# Patient Record
Sex: FEMALE | Employment: OTHER | URBAN - METROPOLITAN AREA
[De-identification: names, ages, dates, MRNs, and addresses within clinical notes are randomized per-mention and may not be internally consistent; named-entity substitution may affect disease eponyms.]

---

## 2017-10-21 ENCOUNTER — HOSPITAL ENCOUNTER (EMERGENCY)
Facility: CLINIC | Age: 32
Discharge: HOME | End: 2017-10-21
Attending: EMERGENCY MEDICINE | Admitting: EMERGENCY MEDICINE
Payer: COMMERCIAL

## 2017-10-21 VITALS
SYSTOLIC BLOOD PRESSURE: 124 MMHG | BODY MASS INDEX: 24.84 KG/M2 | RESPIRATION RATE: 18 BRPM | WEIGHT: 123 LBS | OXYGEN SATURATION: 100 % | DIASTOLIC BLOOD PRESSURE: 80 MMHG | TEMPERATURE: 98.9 F

## 2017-10-21 DIAGNOSIS — K08.89 PAIN, DENTAL: ICD-10-CM

## 2017-10-21 DIAGNOSIS — R07.0 THROAT PAIN: ICD-10-CM

## 2017-10-21 LAB
DEPRECATED S PYO AG THROAT QL EIA: NORMAL
SPECIMEN SOURCE: NORMAL

## 2017-10-21 PROCEDURE — 87880 STREP A ASSAY W/OPTIC: CPT | Performed by: EMERGENCY MEDICINE

## 2017-10-21 PROCEDURE — 99283 EMERGENCY DEPT VISIT LOW MDM: CPT

## 2017-10-21 PROCEDURE — 87081 CULTURE SCREEN ONLY: CPT | Performed by: EMERGENCY MEDICINE

## 2017-10-21 PROCEDURE — 25000132 ZZH RX MED GY IP 250 OP 250 PS 637: Performed by: EMERGENCY MEDICINE

## 2017-10-21 RX ORDER — IBUPROFEN 200 MG
600 TABLET ORAL EVERY 6 HOURS PRN
Qty: 60 TABLET | Refills: 0 | Status: SHIPPED | OUTPATIENT
Start: 2017-10-21

## 2017-10-21 RX ORDER — PENICILLIN V POTASSIUM 500 MG/1
500 TABLET, FILM COATED ORAL 4 TIMES DAILY
Qty: 40 TABLET | Refills: 0 | Status: SHIPPED | OUTPATIENT
Start: 2017-10-21 | End: 2017-10-31

## 2017-10-21 RX ORDER — IBUPROFEN 600 MG/1
600 TABLET, FILM COATED ORAL ONCE
Status: COMPLETED | OUTPATIENT
Start: 2017-10-21 | End: 2017-10-21

## 2017-10-21 RX ADMIN — IBUPROFEN 600 MG: 600 TABLET ORAL at 01:49

## 2017-10-21 ASSESSMENT — ENCOUNTER SYMPTOMS
VOMITING: 0
FEVER: 1
SHORTNESS OF BREATH: 0
VOICE CHANGE: 0
SORE THROAT: 1
NAUSEA: 0

## 2017-10-21 NOTE — ED AVS SNAPSHOT
[facility] Emergency Department    [address]    [facility] [address]    Phone:  [phone]    Fax:  [phone]                                       Penny Juan   MRN: [id number]    Department:  [facility] Emergency Department   Date of Visit:  10/21/2017           After Visit Summary Signature Page     I have received my discharge instructions, and my questions have been answered. I have discussed any challenges I see with this plan with the nurse or doctor.    ..........................................................................................................................................  Patient/Patient Representative Signature      ..........................................................................................................................................  Patient Representative Print Name and Relationship to Patient    ..................................................               ................................................  Date                                            Time    ..........................................................................................................................................  Reviewed by Signature/Title    ...................................................              ..............................................  Date                                                            Time

## 2017-10-21 NOTE — ED PROVIDER NOTES
History     Chief Complaint:  Fever and Pharyngitis      HPI   Penny Juan is a 32 year old female who presents to the emergency department today for evaluation of fever and pharyngitis. The patient reports having a sore throat for two days as well as a tooth ache for one week from a tooth that has been broken for months. She further complains of subjective fever. Therefore, she presents to the emergency department for evaluation. On presentation, she states others around her have had a sore throat.    Allergies:  No Known Drug Allergies    Medications:    Albuterol as needed  Docusate sodium 100 MG tablet  Multiple vitamin PO  Iron supplement PO    Past Medical History:    Asthma  Anemia    Past Surgical History:        Family History:    History reviewed. No pertinent family history.     Social History:  The patient was unaccompanied to the ED.  Smoking Status: Never  Smokeless Tobacco: Never  Alcohol Use: Yes  Marital Status:       Review of Systems   Constitutional: Positive for fever (subjective).   HENT: Positive for dental problem and sore throat. Negative for voice change.    Respiratory: Negative for shortness of breath.    Gastrointestinal: Negative for nausea and vomiting.   All other systems reviewed and are negative.    Physical Exam   First Vitals:  BP: 124/80  Heart Rate: 89  Temp: 100.9  F (38.3  C)  Resp: 18  Weight: 55.8 kg (123 lb)  SpO2: 100 %    Physical Exam  Constitutional: Patient appears well-developed and well-nourished. There is mild distress.   HENT:   Head: No external signs of trauma. No lesions noted.  Eyes: PEERL, EOMI B/L, no pain or limitation of superior gaze  Ears: Normal B/L TM and external canals  Nose: Noncongested, no exudates. No rhinorrhea. No FB noted  Mouth/Throat:                         Mucous membranes are moist and normal.                         No Oropharyngeal exudate.                         Mild oropharyngeal erythema noted.                         No tonsilar swelling noted.                         No uvular deviation noted.                        No swelling noted on the floor of the mouth             The 2nd molar from the back on the right upper side seems fractured, or perhaps a filling has fallen out. Mild gingival erythema without abscess appreciated.  Neck:  No posterior cervical LAD noted. FROM. Neck is supple  Cardiovascular: Normal rate, regular rhythm and normal heart sounds.  Exam reveals no gallop and no friction rub.  No murmur heard. Normal peripheral pulses.  Pulmonary/Chest: Effort normal and breath sounds normal. No respiratory distress. Patient has no wheezes. Patient has no rales.   Abdominal: Soft. There is no tenderness.   Extremities: No edema noted  Neurological: Patient is alert and oriented to person, place, and time.   Skin: Skin is warm and dry. There is no diaphoresis noted.     Emergency Department Course     Laboratory:  Laboratory findings were communicated with the patient who voiced understanding of the findings.  Rapid strep screen: negative     Strep culture: pending    Interventions:  0149 Advil/Motrin 600 mg PO    Emergency Department Course:  Nursing notes and vitals reviewed.  The patient's throat was swabbed and this sample was sent for rapid strep screen, findings above.   0140: I performed an exam of the patient as documented above.   0240: Patient rechecked and updated.   Findings and plan explained to the Patient. Patient discharged home with instructions regarding supportive care, medications, and reasons to return. The importance of close follow-up was reviewed. The patient was prescribed Advil/Motrin and Veetid.  I personally reviewed the laboratory results with the Patient and answered all related questions prior to discharge.    Impression & Plan      Medical Decision Making:  This patient presented with sore throat and clinical evidence of pharyngitis.  The rapid strep test is negative, and  formal culture has been set up in the lab. There is no clinical evidence of  peritonsillar abscess, retropharyngeal abscess, Lemierre's Syndrome, epiglottis, or Amador's angina. The etiology is most likely viral.  The patient's symptoms are consistent with viral pharyngitis.  I have recommended treatment with analgesics, and we will await formal culture results.  If the culture is positive, an ED physician will call the patient to initiate anti-microbial therapy. Return if increasing pain, change in voice, neck pain, vomiting, fever, or shortness of breath. Follow-up with primary physician if not improving in 3-5 days.    The patient presents with a toothache.  There is no abscess detected around the tooth amenable to incision and drainage.  The differential diagnosis includes: cracked tooth syndrome, pulpitis, sub-apical abscess, amongst others.  There is no evidence of  infection, significant facial swelling, or Amador's angina. There are no posterior pharyngeal space infections detected. Appropriate use of Ibuprofen discussed. Dental clinic list given.  Follow up with a dentist/endodontist in the coming days is indicated for further work up and treatment.    Impression:    ICD-10-CM    1. Throat pain R07.0    2. Pain, dental K08.89        Disposition:  discharged to home    Discharge Medications:  New Prescriptions    IBUPROFEN (ADVIL/MOTRIN) 200 MG TABLET    Take 3 tablets (600 mg) by mouth every 6 hours as needed for mild pain or fever    PENICILLIN V POTASSIUM (VEETID) 500 MG TABLET    Take 1 tablet (500 mg) by mouth 4 times daily for 10 days       Scribe Disclosure:  Eduarda LIU, am serving as a scribe at 2:38 AM on 10/21/2017 to document services personally performed by Michael Gongora DO based on my observations and the provider's statements to me.   10/21/2017   [facility] EMERGENCY DEPARTMENT       Michael Gongora DO  10/21/17 0606

## 2017-10-21 NOTE — DISCHARGE INSTRUCTIONS
Viral Pharyngitis (Sore Throat)    You (or your child, if your child is the patient) have pharyngitis (sore throat). This infection is caused by a virus. It can cause throat pain that is worse when swallowing, aching all over, headache, and fever. The infection may be spread by coughing, kissing, or touching others after touching your mouth or nose. Antibiotic medications do not work against viruses, so they are not used for treating this condition.  Home care    If your symptoms are severe, rest at home. Return to work or school when you feel well enough.     Drink plenty of fluids to avoid dehydration.    For children: Use acetaminophen for fever, fussiness or discomfort. In infants over six months of age, you may use ibuprofen instead of acetaminophen. (NOTE: If your child has chronic liver or kidney disease or ever had a stomach ulcer or GI bleeding, talk with your doctor before using these medicines.) (NOTE: Aspirin should never be used in anyone under 18 years of age who is ill with a fever. It may cause severe liver damage.)     For adults: You may use acetaminophen or ibuprofen to control pain or fever, unless another medicine was prescribed for this. (NOTE: If you have chronic liver or kidney disease or ever had a stomach ulcer or GI bleeding, talk with your doctor before using these medicines.)    Throat lozenges or numbing throat sprays can help reduce pain. Gargling with warm salt water will also help reduce throat pain. For this, dissolve 1/2 teaspoon of salt in 1 glass of warm water. To help soothe a sore throat, children can sip on juice or a popsicle. Children 5 years and older can also suck on a lollipop or hard candy.    Avoid salty or spicy foods, which can be irritating to the throat.  Follow-up care  Follow up with your healthcare provider or our staff if you are not improving over the next week.  When to seek medical advice  Call your healthcare provider right away if any of these  occur:    Fever as directed by your doctor.  For children, seek care if:    Your child is of any age and has repeated fevers above 104 F (40 C).    Your child is younger than 2 years of age and has a fever of 100.4 F (38 C) that continues for more than 1 day.    Your child is 2 years old or older and has a fever of 100.4 F (38 C) that continues for more than 3 days.    New or worsening ear pain, sinus pain, or headache    Painful lumps in the back of neck    Stiff neck    Lymph nodes are getting larger    Inability to swallow liquids, excessive drooling, or inability to open mouth wide due to throat pain    Signs of dehydration (very dark urine or no urine, sunken eyes, dizziness)    Trouble breathing or noisy breathing    Muffled voice    New rash    Child appears to be getting sicker  Date Last Reviewed: 4/13/2015 2000-2017 [employer]. [address] [place] [address], [place] [address]. All rights reserved. This information is not intended as a substitute for professional medical care. Always follow your healthcare professional's instructions.

## 2017-10-21 NOTE — ED AVS SNAPSHOT
[facility] Emergency Department    [address]    [facility] [address]    Phone:  [phone]    Fax:  [phone]                                       Penny Juan   MRN: [id number]    Department:  [facility] Emergency Department   Date of Visit:  10/21/2017           Patient Information     Date Of Birth          1985        Your diagnoses for this visit were:     Throat pain     Pain, dental        You were seen by Michael Gongora DO.      Follow-up Information     Follow up with Melissa, Obi Hennessy. Call in 2 days.    Why:  As needed    Contact information:    [address] [facility]  [place] [address]  [phone]          Follow up with [facility] Emergency Department.    Specialty:  EMERGENCY MEDICINE    Why:  If symptoms worsen    Contact information:    [address]  [facility] [address]  [phone]        Discharge Instructions         Viral Pharyngitis (Sore Throat)    You (or your child, if your child is the patient) have pharyngitis (sore throat). This infection is caused by a virus. It can cause throat pain that is worse when swallowing, aching all over, headache, and fever. The infection may be spread by coughing, kissing, or touching others after touching your mouth or nose. Antibiotic medications do not work against viruses, so they are not used for treating this condition.  Home care    If your symptoms are severe, rest at home. Return to work or school when you feel well enough.     Drink plenty of fluids to avoid dehydration.    For children: Use acetaminophen for fever, fussiness or discomfort. In infants over six months of age, you may use ibuprofen instead of acetaminophen. (NOTE: If your child has chronic liver or kidney disease or ever had a stomach ulcer or GI bleeding, talk with your doctor before using these medicines.) (NOTE: Aspirin should never be used in anyone under 18 years of age who  is ill with a fever. It may cause severe liver damage.)     For adults: You may use acetaminophen or ibuprofen to control pain or fever, unless another medicine was prescribed for this. (NOTE: If you have chronic liver or kidney disease or ever had a stomach ulcer or GI bleeding, talk with your doctor before using these medicines.)    Throat lozenges or numbing throat sprays can help reduce pain. Gargling with warm salt water will also help reduce throat pain. For this, dissolve 1/2 teaspoon of salt in 1 glass of warm water. To help soothe a sore throat, children can sip on juice or a popsicle. Children 5 years and older can also suck on a lollipop or hard candy.    Avoid salty or spicy foods, which can be irritating to the throat.  Follow-up care  Follow up with your healthcare provider or our staff if you are not improving over the next week.  When to seek medical advice  Call your healthcare provider right away if any of these occur:    Fever as directed by your doctor.  For children, seek care if:    Your child is of any age and has repeated fevers above 104 F (40 C).    Your child is younger than 2 years of age and has a fever of 100.4 F (38 C) that continues for more than 1 day.    Your child is 2 years old or older and has a fever of 100.4 F (38 C) that continues for more than 3 days.    New or worsening ear pain, sinus pain, or headache    Painful lumps in the back of neck    Stiff neck    Lymph nodes are getting larger    Inability to swallow liquids, excessive drooling, or inability to open mouth wide due to throat pain    Signs of dehydration (very dark urine or no urine, sunken eyes, dizziness)    Trouble breathing or noisy breathing    Muffled voice    New rash    Child appears to be getting sicker  Date Last Reviewed: 4/13/2015 2000-2017 [employer]. [address] [place] [address], [place] [address]. All rights reserved. This information is not intended as a substitute for professional medical  care. Always follow your healthcare professional's instructions.          24 Hour Appointment Hotline       To make an appointment at any [facility], call [phone] ([phone]). If you don't have a family doctor or clinic, we will help you find one. [facility] are conveniently located to serve the needs of you and your family.             Review of your medicines      START taking        Dose / Directions Last dose taken    ibuprofen 200 MG tablet   Commonly known as:  ADVIL/MOTRIN   Dose:  600 mg   Quantity:  60 tablet        Take 3 tablets (600 mg) by mouth every 6 hours as needed for mild pain or fever   Refills:  0        penicillin V potassium 500 MG tablet   Commonly known as:  VEETID   Dose:  500 mg   Quantity:  40 tablet        Take 1 tablet (500 mg) by mouth 4 times daily for 10 days   Refills:  0          Our records show that you are taking the medicines listed below. If these are incorrect, please call your family doctor or clinic.        Dose / Directions Last dose taken    docusate sodium 100 MG tablet   Commonly known as:  COLACE   Dose:  100 mg   Quantity:  10 tablet        Take 100 mg by mouth daily.   Refills:  0        IRON SUPPLEMENT PO        Refills:  0        MULTIPLE VITAMIN PO        Take  by mouth.   Refills:  0                Prescriptions were sent or printed at these locations (2 Prescriptions)                   Other Prescriptions                Printed at Department/Unit printer (2 of 2)         ibuprofen (ADVIL/MOTRIN) 200 MG tablet               penicillin V potassium (VEETID) 500 MG tablet                Procedures and tests performed during your visit     Beta strep group A culture    Rapid strep screen      Orders Needing Specimen Collection     None      Pending Results     Date and Time Order Name Status Description    10/21/2017 0141 Beta strep group A culture In process             Pending Culture Results     Date and Time Order Name Status Description     10/21/2017 0141 Beta strep group A culture In process             Pending Results Instructions     If you had any lab results that were not finalized at the time of your Discharge, you can call the ED Lab Result RN at [phone]. You will be contacted by this team for any positive Lab results or changes in treatment. The nurses are available 7 days a week from 10A to 6:30P.  You can leave a message 24 hours per day and they will return your call.        Test Results From Your Hospital Stay        10/21/2017  2:05 AM      Component Results     Component    Specimen Description    Throat    Rapid Strep A Screen    NEGATIVE: No Group A streptococcal antigen detected by immunoassay, await culture report.         10/21/2017  2:06 AM                Clinical Quality Measure: Blood Pressure Screening     Your blood pressure was checked while you were in the emergency department today. The last reading we obtained was  BP: 124/80 . Please read the guidelines below about what these numbers mean and what you should do about them.  If your systolic blood pressure (the top number) is less than 120 and your diastolic blood pressure (the bottom number) is less than 80, then your blood pressure is normal. There is nothing more that you need to do about it.  If your systolic blood pressure (the top number) is 120-139 or your diastolic blood pressure (the bottom number) is 80-89, your blood pressure may be higher than it should be. You should have your blood pressure rechecked within a year by a primary care provider.  If your systolic blood pressure (the top number) is 140 or greater or your diastolic blood pressure (the bottom number) is 90 or greater, you may have high blood pressure. High blood pressure is treatable, but if left untreated over time it can put you at risk for heart attack, stroke, or kidney failure. You should have your blood pressure rechecked by a primary care provider within the next 4 weeks.  If your  "provider in the emergency department today gave you specific instructions to follow-up with your doctor or provider even sooner than that, you should follow that instruction and not wait for up to 4 weeks for your follow-up visit.        Thank you for choosing [facility]       Thank you for choosing [facility] for your care. Our goal is always to provide you with excellent care. Hearing back from our patients is one way we can continue to improve our services. Please take a few minutes to complete the written survey that you may receive in the mail after you visit with us. Thank you!        [employer] Information     [employer] lets you send messages to your doctor, view your test results, renew your prescriptions, schedule appointments and more. To sign up, go to [email or web address] . Click on \"Log in\" on the left side of the screen, which will take you to the Welcome page. Then click on \"Sign up Now\" on the right side of the page.     You will be asked to enter the access code listed below, as well as some personal information. Please follow the directions to create your username and password.     Your access code is: [email or web address]  Expires: 2018  2:40 AM     Your access code will  in 90 days. If you need help or a new code, please call your [facility] clinic or [phone].        Care EveryWhere ID     This is your Care EveryWhere ID. This could be used by other organizations to access your [facility] medical records  [id number]        Equal Access to Services     KURTIS JEFFERS AH: Hadii luis juleso Socurtis, waaxda luqadaha, qaybta kaalmada adeegyada, yeimi gregory . [facility] [phone].    ATENCIÓN: Si habla español, tiene a baptiste disposición servicios gratuitos de asistencia lingüística. Fiorella al [phone].    We comply with applicable federal civil rights laws and [place] laws. We do not discriminate on the basis of race, color, national origin, age, disability, sex, sexual " orientation, or gender identity.            After Visit Summary       This is your record. Keep this with you and show to your community pharmacist(s) and doctor(s) at your next visit.

## 2017-10-23 LAB
BACTERIA SPEC CULT: NORMAL
SPECIMEN SOURCE: NORMAL

## 2017-11-02 ENCOUNTER — HOSPITAL ENCOUNTER (EMERGENCY)
Facility: CLINIC | Age: 32
Discharge: HOME | End: 2017-11-03
Attending: EMERGENCY MEDICINE | Admitting: EMERGENCY MEDICINE
Payer: COMMERCIAL

## 2017-11-02 VITALS
OXYGEN SATURATION: 99 % | WEIGHT: 123 LBS | HEART RATE: 81 BPM | TEMPERATURE: 98 F | BODY MASS INDEX: 26.54 KG/M2 | DIASTOLIC BLOOD PRESSURE: 74 MMHG | SYSTOLIC BLOOD PRESSURE: 115 MMHG | HEIGHT: 57 IN | RESPIRATION RATE: 16 BRPM

## 2017-11-02 DIAGNOSIS — N93.9 ABNORMAL VAGINAL BLEEDING: ICD-10-CM

## 2017-11-02 DIAGNOSIS — D64.89 ANEMIA DUE TO OTHER CAUSE, NOT CLASSIFIED: ICD-10-CM

## 2017-11-02 LAB
BASOPHILS # BLD AUTO: 0.1 10E9/L (ref 0–0.2)
BASOPHILS NFR BLD AUTO: 0.7 %
DIFFERENTIAL METHOD BLD: ABNORMAL
EOSINOPHIL # BLD AUTO: 0.1 10E9/L (ref 0–0.7)
EOSINOPHIL NFR BLD AUTO: 1.5 %
ERYTHROCYTE [DISTWIDTH] IN BLOOD BY AUTOMATED COUNT: 14.8 % (ref 10–15)
HCT VFR BLD AUTO: 30.6 % (ref 35–47)
HGB BLD-MCNC: 9.4 G/DL (ref 11.7–15.7)
IMM GRANULOCYTES # BLD: 0 10E9/L (ref 0–0.4)
IMM GRANULOCYTES NFR BLD: 0.1 %
LYMPHOCYTES # BLD AUTO: 2.2 10E9/L (ref 0.8–5.3)
LYMPHOCYTES NFR BLD AUTO: 29.5 %
MCH RBC QN AUTO: 24.4 PG (ref 26.5–33)
MCHC RBC AUTO-ENTMCNC: 30.7 G/DL (ref 31.5–36.5)
MCV RBC AUTO: 79 FL (ref 78–100)
MONOCYTES # BLD AUTO: 0.6 10E9/L (ref 0–1.3)
MONOCYTES NFR BLD AUTO: 8.2 %
NEUTROPHILS # BLD AUTO: 4.5 10E9/L (ref 1.6–8.3)
NEUTROPHILS NFR BLD AUTO: 60 %
NRBC # BLD AUTO: 0 10*3/UL
NRBC BLD AUTO-RTO: 0 /100
PLATELET # BLD AUTO: 346 10E9/L (ref 150–450)
RBC # BLD AUTO: 3.86 10E12/L (ref 3.8–5.2)
WBC # BLD AUTO: 7.4 10E9/L (ref 4–11)

## 2017-11-02 PROCEDURE — 84702 CHORIONIC GONADOTROPIN TEST: CPT | Performed by: NURSE PRACTITIONER

## 2017-11-02 PROCEDURE — 25000128 H RX IP 250 OP 636: Performed by: NURSE PRACTITIONER

## 2017-11-02 PROCEDURE — 85025 COMPLETE CBC W/AUTO DIFF WBC: CPT | Performed by: NURSE PRACTITIONER

## 2017-11-02 PROCEDURE — 99284 EMERGENCY DEPT VISIT MOD MDM: CPT | Mod: 25

## 2017-11-02 PROCEDURE — 96361 HYDRATE IV INFUSION ADD-ON: CPT

## 2017-11-02 PROCEDURE — 96360 HYDRATION IV INFUSION INIT: CPT

## 2017-11-02 PROCEDURE — 80048 BASIC METABOLIC PNL TOTAL CA: CPT | Performed by: NURSE PRACTITIONER

## 2017-11-02 RX ADMIN — SODIUM CHLORIDE 1000 ML: 9 INJECTION, SOLUTION INTRAVENOUS at 23:52

## 2017-11-02 NOTE — ED AVS SNAPSHOT
[facility] Emergency Department    [address]    [facility] [address]    Phone:  [phone]    Fax:  [phone]                                       Penny Juan   MRN: [id number]    Department:  [facility] Emergency Department   Date of Visit:  11/2/2017           After Visit Summary Signature Page     I have received my discharge instructions, and my questions have been answered. I have discussed any challenges I see with this plan with the nurse or doctor.    ..........................................................................................................................................  Patient/Patient Representative Signature      ..........................................................................................................................................  Patient Representative Print Name and Relationship to Patient    ..................................................               ................................................  Date                                            Time    ..........................................................................................................................................  Reviewed by Signature/Title    ...................................................              ..............................................  Date                                                            Time

## 2017-11-02 NOTE — ED AVS SNAPSHOT
[facility] Emergency Department    [address]    [facility] [address]    Phone:  [phone]    Fax:  [phone]                                       Penny Juan   MRN: [id number]    Department:  [facility] Emergency Department   Date of Visit:  11/2/2017           Patient Information     Date Of Birth          1985        Your diagnoses for this visit were:     Abnormal vaginal bleeding        You were seen by Jeff Lyon MD.      Follow-up Information     Follow up with Miracle Pennington DO. Call today.    Specialty:  OB/Gyn    Contact information:    [facility]  [address] [place]  [facility] [id number] [phone]          Discharge Instructions       Take oral birth control pills according to instructions on label.  Call your OB clinic in the am to update on your bleeding.  See them tomorrow if needed or early next week to recheck your hemoglobin.  Return to the ED immediately with increased bleeding, lightheadedness, racing heart beat, shortness of breath or any other concerning symptoms.        24 Hour Appointment Hotline       To make an appointment at any [facility] clinic, call [phone] ([phone]). If you don't have a family doctor or clinic, we will help you find one. [facility] clinics are conveniently located to serve the needs of you and your family.             Review of your medicines      START taking        Dose / Directions Last dose taken    norgestimate-ethinyl estradiol 0.25-35 MG-MCG per tablet   Commonly known as:  ORTHO-CYCLEN, SPRINTEC   Quantity:  10 tablet        Take 3 tablets for one day, 2 tablets for one day, then 1 tablet each day until vaginal bleeding stops   Refills:  0          Our records show that you are taking the medicines listed below. If these are incorrect, please call your family doctor or clinic.        Dose / Directions Last dose taken    docusate sodium 100 MG tablet    Commonly known as:  COLACE   Dose:  100 mg   Quantity:  10 tablet        Take 100 mg by mouth daily.   Refills:  0        ibuprofen 200 MG tablet   Commonly known as:  ADVIL/MOTRIN   Dose:  600 mg   Quantity:  60 tablet        Take 3 tablets (600 mg) by mouth every 6 hours as needed for mild pain or fever   Refills:  0        IRON SUPPLEMENT PO        Refills:  0        MULTIPLE VITAMIN PO        Take  by mouth.   Refills:  0                Prescriptions were sent or printed at these locations (1 Prescription)                   Other Prescriptions                Printed at Department/Unit printer (1 of 1)         norgestimate-ethinyl estradiol (ORTHO-CYCLEN, SPRINTEC) 0.25-35 MG-MCG per tablet                Procedures and tests performed during your visit     Basic metabolic panel (BMP)    CBC + differential    Chlamydia trachomatis PCR    HCG quantitative pregnancy    Neisseria gonorrhoea PCR    Prep for procedure - pelvic exam    US Pelvic Complete w Transvaginal & Abd/Pel Duplex Limited      Orders Needing Specimen Collection     None      Pending Results     Date and Time Order Name Status Description    11/2/2017 2342 Neisseria gonorrhoea PCR In process     11/2/2017 2342 Chlamydia trachomatis PCR In process     11/2/2017 2342 US Pelvic Complete w Transvaginal & Abd/Pel Duplex Limited Preliminary             Pending Culture Results     Date and Time Order Name Status Description    11/2/2017 2342 Neisseria gonorrhoea PCR In process     11/2/2017 2342 Chlamydia trachomatis PCR In process             Pending Results Instructions     If you had any lab results that were not finalized at the time of your Discharge, you can call the ED Lab Result RN at [phone]. You will be contacted by this team for any positive Lab results or changes in treatment. The nurses are available 7 days a week from 10A to 6:30P.  You can leave a message 24 hours per day and they will return your call.        Test Results From Your  Hospital Stay        11/2/2017 11:57 PM      Component Results     Component Value Ref Range & Units Status    WBC 7.4 4.0 - 11.0 10e9/L Final    RBC Count 3.86 3.8 - 5.2 10e12/L Final    Hemoglobin 9.4 (L) 11.7 - 15.7 g/dL Final    Hematocrit 30.6 (L) 35.0 - 47.0 % Final    MCV 79 78 - 100 fl Final    MCH 24.4 (L) 26.5 - 33.0 pg Final    MCHC 30.7 (L) 31.5 - 36.5 g/dL Final    RDW 14.8 10.0 - 15.0 % Final    Platelet Count 346 150 - 450 10e9/L Final    Diff Method Automated Method  Final    % Neutrophils 60.0 % Final    % Lymphocytes 29.5 % Final    % Monocytes 8.2 % Final    % Eosinophils 1.5 % Final    % Basophils 0.7 % Final    % Immature Granulocytes 0.1 % Final    Nucleated RBCs 0 0 /100 Final    Absolute Neutrophil 4.5 1.6 - 8.3 10e9/L Final    Absolute Lymphocytes 2.2 0.8 - 5.3 10e9/L Final    Absolute Monocytes 0.6 0.0 - 1.3 10e9/L Final    Absolute Eosinophils 0.1 0.0 - 0.7 10e9/L Final    Absolute Basophils 0.1 0.0 - 0.2 10e9/L Final    Abs Immature Granulocytes 0.0 0 - 0.4 10e9/L Final    Absolute Nucleated RBC 0.0  Final         11/3/2017 12:15 AM      Component Results     Component Value Ref Range & Units Status    Sodium 140 133 - 144 mmol/L Final    Potassium 3.3 (L) 3.4 - 5.3 mmol/L Final    Chloride 107 94 - 109 mmol/L Final    Carbon Dioxide 26 20 - 32 mmol/L Final    Anion Gap 7 3 - 14 mmol/L Final    Glucose 108 (H) 70 - 99 mg/dL Final    Urea Nitrogen 9 7 - 30 mg/dL Final    Creatinine 0.71 0.52 - 1.04 mg/dL Final    GFR Estimate >90 >60 mL/min/1.7m2 Final    Non  GFR Calc    GFR Estimate If Black >90 >60 mL/min/1.7m2 Final    African American GFR Calc    Calcium 7.8 (L) 8.5 - 10.1 mg/dL Final         11/3/2017 12:33 AM      Narrative     US PELVIS COMPLETE W TRANSVAGINAL AND DOPPLER LIMITED  11/3/2017 12:29  AM      HISTORY: Pelvic pain.     COMPARISON: None.    FINDINGS: Transabdominal and transvaginal imaging was performed.  Transvaginal imaging was performed to better  visualize the endometrium  and adnexa. The uterus is normal in size and position measuring 8.7 x  4.5 x 5.4 cm. The endometrium is normal in thickness at 1.0 cm. The  ovaries are normal in size and appearance bilaterally. No adnexal  mass. No free pelvic fluid.        Impression     IMPRESSION: Normal pelvis.         11/3/2017 12:40 AM         11/3/2017 12:40 AM         11/3/2017 12:20 AM      Component Results     Component Value Ref Range & Units Status    HCG Quantitative Serum <1 0 - 5 IU/L Final                Clinical Quality Measure: Blood Pressure Screening     Your blood pressure was checked while you were in the emergency department today. The last reading we obtained was  BP: 115/74 . Please read the guidelines below about what these numbers mean and what you should do about them.  If your systolic blood pressure (the top number) is less than 120 and your diastolic blood pressure (the bottom number) is less than 80, then your blood pressure is normal. There is nothing more that you need to do about it.  If your systolic blood pressure (the top number) is 120-139 or your diastolic blood pressure (the bottom number) is 80-89, your blood pressure may be higher than it should be. You should have your blood pressure rechecked within a year by a primary care provider.  If your systolic blood pressure (the top number) is 140 or greater or your diastolic blood pressure (the bottom number) is 90 or greater, you may have high blood pressure. High blood pressure is treatable, but if left untreated over time it can put you at risk for heart attack, stroke, or kidney failure. You should have your blood pressure rechecked by a primary care provider within the next 4 weeks.  If your provider in the emergency department today gave you specific instructions to follow-up with your doctor or provider even sooner than that, you should follow that instruction and not wait for up to 4 weeks for your follow-up visit.       "  Thank you for choosing [facility]       Thank you for choosing [facility] for your care. Our goal is always to provide you with excellent care. Hearing back from our patients is one way we can continue to improve our services. Please take a few minutes to complete the written survey that you may receive in the mail after you visit with us. Thank you!        [employer] Information     [employer] lets you send messages to your doctor, view your test results, renew your prescriptions, schedule appointments and more. To sign up, go to [email or web address] . Click on \"Log in\" on the left side of the screen, which will take you to the Welcome page. Then click on \"Sign up Now\" on the right side of the page.     You will be asked to enter the access code listed below, as well as some personal information. Please follow the directions to create your username and password.     Your access code is: [email or web address]  Expires: 2018  2:40 AM     Your access code will  in 90 days. If you need help or a new code, please call your [facility] clinic or [phone].        Care EveryWhere ID     This is your Care EveryWhere ID. This could be used by other organizations to access your [facility] medical records  [id number]        Equal Access to Services     KURTIS JEFFERS : Kiki juleso Socurtis, waaxda luqadaha, qaybta kaalmada adecamillayada, yeimi vargas. So [place] -[phone].    ATENCIÓN: Si habla español, tiene a baptiste disposición servicios gratuitos de asistencia lingüística. Llame al [phone].    We comply with applicable federal civil rights laws and [place] laws. We do not discriminate on the basis of race, color, national origin, age, disability, sex, sexual orientation, or gender identity.            After Visit Summary       This is your record. Keep this with you and show to your community pharmacist(s) and doctor(s) at your next visit.                  "

## 2017-11-03 ENCOUNTER — APPOINTMENT (OUTPATIENT)
Dept: ULTRASOUND IMAGING | Facility: CLINIC | Age: 32
End: 2017-11-03
Attending: NURSE PRACTITIONER
Payer: COMMERCIAL

## 2017-11-03 LAB
ANION GAP SERPL CALCULATED.3IONS-SCNC: 7 MMOL/L (ref 3–14)
B-HCG SERPL-ACNC: <1 IU/L (ref 0–5)
BUN SERPL-MCNC: 9 MG/DL (ref 7–30)
C TRACH DNA SPEC QL NAA+PROBE: NEGATIVE
CALCIUM SERPL-MCNC: 7.8 MG/DL (ref 8.5–10.1)
CHLORIDE SERPL-SCNC: 107 MMOL/L (ref 94–109)
CO2 SERPL-SCNC: 26 MMOL/L (ref 20–32)
CREAT SERPL-MCNC: 0.71 MG/DL (ref 0.52–1.04)
GFR SERPL CREATININE-BSD FRML MDRD: >90 ML/MIN/1.7M2
GLUCOSE SERPL-MCNC: 108 MG/DL (ref 70–99)
N GONORRHOEA DNA SPEC QL NAA+PROBE: NEGATIVE
POTASSIUM SERPL-SCNC: 3.3 MMOL/L (ref 3.4–5.3)
SODIUM SERPL-SCNC: 140 MMOL/L (ref 133–144)
SPECIMEN SOURCE: NORMAL
SPECIMEN SOURCE: NORMAL

## 2017-11-03 PROCEDURE — 87491 CHLMYD TRACH DNA AMP PROBE: CPT | Performed by: NURSE PRACTITIONER

## 2017-11-03 PROCEDURE — 87591 N.GONORRHOEAE DNA AMP PROB: CPT | Performed by: NURSE PRACTITIONER

## 2017-11-03 PROCEDURE — 93976 VASCULAR STUDY: CPT | Mod: XS

## 2017-11-03 RX ORDER — NORGESTIMATE AND ETHINYL ESTRADIOL 0.25-0.035
KIT ORAL
Qty: 10 TABLET | Refills: 0 | Status: SHIPPED | OUTPATIENT
Start: 2017-11-03 | End: 2017-11-10

## 2017-11-03 ASSESSMENT — ENCOUNTER SYMPTOMS
NAUSEA: 1
CARDIOVASCULAR NEGATIVE: 1
ABDOMINAL PAIN: 1
FATIGUE: 1
RESPIRATORY NEGATIVE: 1

## 2017-11-03 NOTE — ED PROVIDER NOTES
History     Chief Complaint:  Heavy period    HPI   Penny Juan is a 32 year old female who presents to the emergency department today for evaluation of heavy vaginal bleeding.  Patient was started on the Orthevra patch due to heavy menses with low hemoglobin two months ago.  Her last scheduled period was two weeks ago with moderate bleeding.  Today she started having vaginal bleeding at 1100 and noticed that her patch had fallen out.  She suspects this happened last night.  She replaced the patch around 5 pm when she got home from work.  She states she is passing large clots and saturating a super tampon every hour.  She complains of nausea, lightheadedness and fatigue and states she developed left sided cramping around 7 pm.  Denies abnormal vaginal discharge prior to today, urinary symptoms, emesis, palpitation, shortness of breath.  She has had had 2 C-Sections and a tubal ligation in .      Allergies:  No Known Drug Allergies    Medications:    Iron  Docusate sodium  Ortho evra transdermal patch    Past Medical History:    Anemia  Bronchitis, not specified as acute or chronic      Severe pre-eclampsia     Scoliosis      Past Surgical History:    Gyn surgery  Tubal ligation   section    Family History:    History reviewed. No pertinent family history.     Social History:  The patient was accompanied to the ED by .  Smoking Status: Never smoker  Smokeless Tobacco: Never used  Alcohol Use: Occasionally  Marital Status:   [2]     Review of Systems   Constitutional: Positive for fatigue.   Respiratory: Negative.    Cardiovascular: Negative.    Gastrointestinal: Positive for abdominal pain and nausea.   Genitourinary: Positive for menstrual problem, pelvic pain and vaginal bleeding.   All other systems reviewed and are negative.      Physical Exam   Vitals:  Patient Vitals for the past 24 hrs:   BP Temp Pulse Resp SpO2 Height Weight   17 2320 115/74 98  F (36.7  C) 81 16  "99 % 1.448 m (4' 9\") 55.8 kg (123 lb)     Physical Exam  Constitutional: Alert, attentive, GCS 15.    HENT:  Oropharynx is clear without erythema or exudates, mucous membranes are moist.   Eyes: EOM are normal. Pupils are equal, round, and reactive to light. Conjunctiva pink, no scleral icterus or conjunctival injection  CV: regular rate and rhythm; no murmurs, rubs or gallups.  Radial, dorsalis pedis and posterior tibial pulses 2+ bilaterally.  Cap refill <3 seconds.  Respiratory: Effort normal. Lungs clear to auscultation bilaterally. No crackles/rubs/wheezes.  Good air movement.  GI:  Tenderness low in left lower quadrant. No distension. Normal bowel sounds.   (Chaperoned):    Vulva: No external lesions, normal hair distribution, no adenopathy   Vagina: Moist, pink, small/ moderate amount bright red bloody discharge, no clots, well   rugated, no lesions   Cervix:  Small amount of blood noted in os.  Smooth, pink, no visible lesions, no CMT   Uterus: Normal size, non-tender, mobile   Ovaries: No mass, non-tender, mobile   Cultures for GC, Chlamydia were taken  MSK: Normal range of motion. No peripheral edema or calf tenderness.  Neurological: Alert, attentive.  Skin: Skin is warm and dry.  No rashes or petechiae.  Psychiatric: Normal affect.      Emergency Department Course       Imaging:  Radiology findings were communicated with the patient who voiced understanding of the findings.  .  US Pelvic Complete w Transvaginal & Abd/Pel Duplex Limited   Preliminary Result   IMPRESSION: Normal pelvis.          Laboratory:  Laboratory findings were communicated with the patient who voiced understanding of the findings.    CBC: Hgb 9.4 (L) Hct 30.6 (L) MCH 24.4 (L) MCHC 30.7 (L) o/w WNL.  ()   BMP: Glucose 108 Potassium 3.3 o/w WNL (Creatinine 0.71)  HCG quantitative pregnancy: <1  Gonorrhea and Chlamydia cultures pending      Interventions:  Medications   0.9% sodium chloride BOLUS (1,000 mLs Intravenous New Bag " 11/2/17 3715)     Emergency Department Course:  Nursing notes and vitals reviewed.  I performed an exam of the patient as documented above.     IV was inserted and blood was drawn for laboratory testing, results above.  MD Lyon in to evaluate patient  The patient was sent for an ultrasound while in the emergency department, results above.   Pelvic exam performed as noted above.   OBERIKA ePnnington consulted    At 0100 the patient was rechecked and was updated on the results of  laboratory and imaging studies.     I discussed the treatment plan with the patient. Patient expressed understanding of this plan and consented to discharge. She will be discharged home with instructions for care and follow up. In addition, the patient will return to the emergency department if their symptoms worsen, if new symptoms arise or if there is any concern.  All questions were answered.    Impression & Plan      Medical Decision Making:  Penny Juan is a 32 year old who presents with heavy vaginal bleeding.  This is most consistent with midcycle withdrawal bleed after accidental removal of Ortho Evra transdermal patch.  Records from visit with OB reviewed with permission of patient.  Her Hgb in September was 10.5 and today is 9.4.  She is hemodynamically stable with normal vital signs although symptomatic.  She was given 1 liter of NS bolus in ED and reports improvement in lightheadedness.  Nausea resolved. She states vaginal bleeding has begun to slow down. Ultrasound showed no abnormalities and normal endometrial thickenss.  Pelvic exam revealed mild to moderate bleeding, no pooling of blood and no clots. OBGYN MD Pennington was consulted who recommended a taper of oral monophasic contraceptives and follow-up in clinic tomorrow if bleeding remains heavy or next week if bleeding continues to taper for a hemoglobin recheck.  Patient was given prescription for Orthocyclin.  At this time it is felt she is safe to discharge to  home.  She will call her OBGYN in the morning to discuss bleeding and need to be seen. She will return to the ED immediately with increased bleeding, lightheadedness, shortness of breath, tachycardia or any further concerns.     Diagnosis:    ICD-10-CM    1. Abnormal vaginal bleeding N93.9    2. Anemia due to other cause, not classified D64.89        Disposition:   Home    Discharge Medications:    New Prescriptions    NORGESTIMATE-ETHINYL ESTRADIOL (ORTHO-CYCLEN, SPRINTEC) 0.25-35 MG-MCG PER TABLET    Take 3 tablets for one day, 2 tablets for one day, then 1 tablet each day until vaginal bleeding stops       Scribe Disclosure:  I, David Maria, am serving as a scribe at 11:27 PM on 11/2/2017 to document services personally performed by Jeff Lyon MD, based on my observations and the provider's statements to me.    11/2/2017   [facility] EMERGENCY DEPARTMENT       Krista Chou APRN CNP  11/03/17 0140    See Supervisory Note         Jeff Lyon MD  11/03/17 8495

## 2017-11-03 NOTE — ED PROVIDER NOTES
Emergency Department Attending Supervision Note  11/3/2017  2:03 AM      I evaluated this patient in conjunction with Krista Chou CNP.    Briefly, the patient presented with heavy vaginal bleeding.    On my exam, the patient has normal vital signs, no significant abdominal pain, no unusual rash., heart and lung sounds are normal.    Impression: Patient has findings of heavy vaginal bleeding, likely secondary to hormonal process as patient normally wears patch and it had fallen off. We did contact ObGyn and the recommended continued monitoring and that she could have additional hormone replacement pills to help slow the bleeding. Krista Chou CNP, did the pelvic examination. There was no significant vaginal bleeding or pooling of bright red blood in the vaginal vault at this time and was reported to be only noted a small amount of cervical bleeding. I felt the patient to be safe for discharge home with close monitoring. She will follow up with ObGyn and contact them tomorrow to discuss her symptoms. Patient is aware if she develops worsening or ongoing bleeding with dizziness, lightheadedness, or fainting, she is to return here.    Diagnosis:    ICD-10-CM    1. Abnormal vaginal bleeding N93.9    2. Anemia due to other cause, not classified D64.89      MD Sonali Villagomez Lucas P, MD  11/03/17 0319

## 2017-11-03 NOTE — ED NOTES
Pt has been having heavy periods  Today started  Heavy but had her last period 2 weeks ago  But she is on patch to help regulate  Put new one on today because it was not on  Here for eval

## 2017-11-03 NOTE — DISCHARGE INSTRUCTIONS
Take oral birth control pills according to instructions on label.  Call your OB clinic in the am to update on your bleeding.  See them tomorrow if needed or early next week to recheck your hemoglobin.  Return to the ED immediately with increased bleeding, lightheadedness, racing heart beat, shortness of breath or any other concerning symptoms.

## 2017-11-10 ENCOUNTER — HOSPITAL ENCOUNTER (EMERGENCY)
Facility: CLINIC | Age: 32
Discharge: HOME | End: 2017-11-10
Attending: EMERGENCY MEDICINE | Admitting: EMERGENCY MEDICINE
Payer: COMMERCIAL

## 2017-11-10 VITALS
TEMPERATURE: 98.2 F | OXYGEN SATURATION: 99 % | RESPIRATION RATE: 16 BRPM | DIASTOLIC BLOOD PRESSURE: 61 MMHG | SYSTOLIC BLOOD PRESSURE: 102 MMHG

## 2017-11-10 DIAGNOSIS — N92.0 EXCESSIVE OR FREQUENT MENSTRUATION: ICD-10-CM

## 2017-11-10 DIAGNOSIS — D64.9 ANEMIA, UNSPECIFIED TYPE: ICD-10-CM

## 2017-11-10 LAB
ANION GAP SERPL CALCULATED.3IONS-SCNC: 6 MMOL/L (ref 3–14)
APTT PPP: 31 SEC (ref 22–37)
BASOPHILS # BLD AUTO: 0.1 10E9/L (ref 0–0.2)
BASOPHILS NFR BLD AUTO: 0.7 %
BUN SERPL-MCNC: 8 MG/DL (ref 7–30)
CALCIUM SERPL-MCNC: 7.9 MG/DL (ref 8.5–10.1)
CHLORIDE SERPL-SCNC: 107 MMOL/L (ref 94–109)
CO2 SERPL-SCNC: 25 MMOL/L (ref 20–32)
CREAT SERPL-MCNC: 0.58 MG/DL (ref 0.52–1.04)
DIFFERENTIAL METHOD BLD: ABNORMAL
EOSINOPHIL # BLD AUTO: 0 10E9/L (ref 0–0.7)
EOSINOPHIL NFR BLD AUTO: 0.6 %
ERYTHROCYTE [DISTWIDTH] IN BLOOD BY AUTOMATED COUNT: 14.4 % (ref 10–15)
GFR SERPL CREATININE-BSD FRML MDRD: >90 ML/MIN/1.7M2
GLUCOSE SERPL-MCNC: 90 MG/DL (ref 70–99)
HCG SERPL QL: NEGATIVE
HCT VFR BLD AUTO: 27.2 % (ref 35–47)
HGB BLD-MCNC: 8.4 G/DL (ref 11.7–15.7)
IMM GRANULOCYTES # BLD: 0 10E9/L (ref 0–0.4)
IMM GRANULOCYTES NFR BLD: 0.3 %
INR PPP: 1.04 (ref 0.86–1.14)
LYMPHOCYTES # BLD AUTO: 2.1 10E9/L (ref 0.8–5.3)
LYMPHOCYTES NFR BLD AUTO: 30.5 %
MCH RBC QN AUTO: 24.5 PG (ref 26.5–33)
MCHC RBC AUTO-ENTMCNC: 30.9 G/DL (ref 31.5–36.5)
MCV RBC AUTO: 79 FL (ref 78–100)
MONOCYTES # BLD AUTO: 0.5 10E9/L (ref 0–1.3)
MONOCYTES NFR BLD AUTO: 7.3 %
NEUTROPHILS # BLD AUTO: 4.1 10E9/L (ref 1.6–8.3)
NEUTROPHILS NFR BLD AUTO: 60.6 %
NRBC # BLD AUTO: 0 10*3/UL
NRBC BLD AUTO-RTO: 0 /100
PLATELET # BLD AUTO: 409 10E9/L (ref 150–450)
POTASSIUM SERPL-SCNC: 3 MMOL/L (ref 3.4–5.3)
RBC # BLD AUTO: 3.43 10E12/L (ref 3.8–5.2)
SODIUM SERPL-SCNC: 138 MMOL/L (ref 133–144)
WBC # BLD AUTO: 6.8 10E9/L (ref 4–11)

## 2017-11-10 PROCEDURE — 85610 PROTHROMBIN TIME: CPT | Performed by: EMERGENCY MEDICINE

## 2017-11-10 PROCEDURE — 84703 CHORIONIC GONADOTROPIN ASSAY: CPT | Performed by: EMERGENCY MEDICINE

## 2017-11-10 PROCEDURE — 99284 EMERGENCY DEPT VISIT MOD MDM: CPT | Mod: 25

## 2017-11-10 PROCEDURE — 96374 THER/PROPH/DIAG INJ IV PUSH: CPT

## 2017-11-10 PROCEDURE — 96361 HYDRATE IV INFUSION ADD-ON: CPT

## 2017-11-10 PROCEDURE — 85025 COMPLETE CBC W/AUTO DIFF WBC: CPT | Performed by: EMERGENCY MEDICINE

## 2017-11-10 PROCEDURE — 80048 BASIC METABOLIC PNL TOTAL CA: CPT | Performed by: EMERGENCY MEDICINE

## 2017-11-10 PROCEDURE — 96375 TX/PRO/DX INJ NEW DRUG ADDON: CPT

## 2017-11-10 PROCEDURE — 85730 THROMBOPLASTIN TIME PARTIAL: CPT | Performed by: EMERGENCY MEDICINE

## 2017-11-10 PROCEDURE — 25000128 H RX IP 250 OP 636: Performed by: EMERGENCY MEDICINE

## 2017-11-10 RX ORDER — DIPHENHYDRAMINE HYDROCHLORIDE 50 MG/ML
25 INJECTION INTRAMUSCULAR; INTRAVENOUS ONCE
Status: COMPLETED | OUTPATIENT
Start: 2017-11-10 | End: 2017-11-10

## 2017-11-10 RX ORDER — ONDANSETRON 4 MG/1
4 TABLET, ORALLY DISINTEGRATING ORAL EVERY 8 HOURS PRN
Qty: 10 TABLET | Refills: 0 | Status: SHIPPED | OUTPATIENT
Start: 2017-11-10 | End: 2017-11-13

## 2017-11-10 RX ORDER — KETOROLAC TROMETHAMINE 15 MG/ML
15 INJECTION, SOLUTION INTRAMUSCULAR; INTRAVENOUS ONCE
Status: COMPLETED | OUTPATIENT
Start: 2017-11-10 | End: 2017-11-10

## 2017-11-10 RX ORDER — METOCLOPRAMIDE HYDROCHLORIDE 5 MG/ML
10 INJECTION INTRAMUSCULAR; INTRAVENOUS ONCE
Status: COMPLETED | OUTPATIENT
Start: 2017-11-10 | End: 2017-11-10

## 2017-11-10 RX ORDER — SODIUM CHLORIDE 9 MG/ML
1000 INJECTION, SOLUTION INTRAVENOUS CONTINUOUS
Status: DISCONTINUED | OUTPATIENT
Start: 2017-11-10 | End: 2017-11-10 | Stop reason: HOSPADM

## 2017-11-10 RX ADMIN — SODIUM CHLORIDE 1000 ML: 9 INJECTION, SOLUTION INTRAVENOUS at 16:40

## 2017-11-10 RX ADMIN — METOCLOPRAMIDE 10 MG: 5 INJECTION, SOLUTION INTRAMUSCULAR; INTRAVENOUS at 17:40

## 2017-11-10 RX ADMIN — DIPHENHYDRAMINE HYDROCHLORIDE 25 MG: 50 INJECTION, SOLUTION INTRAMUSCULAR; INTRAVENOUS at 17:40

## 2017-11-10 RX ADMIN — KETOROLAC TROMETHAMINE 15 MG: 15 INJECTION, SOLUTION INTRAMUSCULAR; INTRAVENOUS at 17:40

## 2017-11-10 ASSESSMENT — ENCOUNTER SYMPTOMS
HEADACHES: 1
LIGHT-HEADEDNESS: 0
FEVER: 0

## 2017-11-10 NOTE — ED PROVIDER NOTES
History     Chief Complaint:  Vaginal Bleeding    HPI   Penny Juan is a 32 year old female who presents with vaginal bleeding. Per chart review, the patient was last seen here 8 days ago for the same problem. She called her doctor this morning, Dr. Raymond of OB/GYN at [facility], who recommended the patient come here. The patient states her bleeding started last week 8 days ago, but she had just finished a period the last week of October, so she was concerned and sought out an evaluation. She explains that her birth control patch had fallen off just before her bleeding started. She was put on birth control pills after she was evaluated, and her bleeding resolved, but then her bleeding returned very heavily this morning. She is no longer taking the pills. 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 She also endorses a headache. The patient denies fever, light-headedness, and states no other concerns at this time.      Allergies:  No Known Drug Allergies     Medications:    Iron  Docusate sodium  Ortho evra transdermal patch     Past Medical History:    Anemia  Bronchitis, not specified as acute or chronic                                     Severe pre-eclampsia                          Scoliosis                                               Past Surgical History:    Gyn surgery  Tubal ligation   section     Family History:    History reviewed. No pertinent family history.      Social History:  The patient was accompanied to the ED by .  Smoking Status: Never smoker  Smokeless Tobacco: Never used  Alcohol Use: Occasionally  Marital Status:   [2]     Review of Systems   Constitutional: Negative for fever.   Genitourinary: Positive for vaginal bleeding and vaginal discharge.   Neurological: Positive for headaches. Negative for light-headedness.   All other systems reviewed and are  negative.    Physical Exam     Patient Vitals for the past 24 hrs:   BP Temp Temp src Heart Rate Resp SpO2   11/10/17 1811 - - - - - 99 %   11/10/17 1810 102/61 - - - - -   11/10/17 1425 122/88 98.2  F (36.8  C) Oral 80 16 100 %      Physical Exam  Constitutional:  Appears well-developed and well-nourished. Alert. Conversant. Non toxic.  HENT:   Head: Atraumatic.   Nose: Nose normal.  Mouth/Throat: Oral mucosa is clear and moist. no trismus. Pharynx normal. Tonsils symmetric. No tonsillar enlargement, erythema, or exudate.  Eyes: Conjunctivae normal. EOM normal. Pupils equal, round, and reactive to light. No scleral icterus.   Neck: Normal range of motion. Neck supple. No tracheal deviation present.   Cardiovascular: Normal rate, regular rhythm. No gallop. No friction rub. No murmur heard. Symmetric radial artery pulses   Pulmonary/Chest: Effort normal. No stridor. No respiratory distress. No wheezes. No rales. No rhonchi . No tenderness.   Abdominal: Soft. Bowel sounds normal. No distension. No mass. Suprapubic and LLQ tenderness. No rebound. No guarding.   Musculoskeletal: Normal range of motion. No edema. No tenderness. No deformity   Lymph: No cervical adenopathy.   Neurological: Alert and oriented to person, place, and time. Normal strength. CN II-VII intact. No sensory deficit. GCS eye subscore is 4. GCS verbal subscore is 5. GCS motor subscore is 6. Normal coordination   Skin: Skin is warm and dry. No rash noted. No pallor. Normal capillary refill.  Psychiatric:  Normal mood. Normal affect.     Emergency Department Course   Laboratory:  Laboratory findings were communicated with the patient who voiced understanding of the findings.  CBC: HGB 8.4 (L), o/w WNL. (WBC 6.8, )   BMP: Potassium 3.0 (L), calcium 7.9 (L), o/w WNL (Creatinine 0.58)    INR: 1.04   PTT: 31  HCG Qualitative Urine: negative      Interventions:  1640: NS, 1 L, IV   1740: Reglan 10 mg IV  1740: Benadryl, 25 mg, IV injection    1740:  Toradol, 15 mg, IV injection     Emergency Department Course:  Nursing notes and vitals reviewed.  I performed an exam of the patient as documented above.   IV was inserted and blood was drawn for laboratory testing, results above.  1742: I spoke with Dr. Pennington of the OB/GYN service regarding patient's presentation, findings, and plan of care.    1750: Patient rechecked and updated.   Findings and plan explained to the patient. Patient discharged home with instructions regarding supportive care, medications, and reasons to return. The importance of close follow-up was reviewed.   I personally reviewed the laboratory results with the Patient and answered all related questions prior to discharge.     Impression & Plan      Medical Decision Making:   Penny Juan is a 32 year old female who presents to the emergency department today for evaluation of vaginal bleeding. She has a history of oregano vaginal bleeding for months, and had been managing this through the [facility] office with birth control patch. However the patch a couple weeks ago when she was seen here in the ER last week for withdrawal vaginal bleeding after that. Hemoglobin had been down from a baseline around 10.5, to 9.5 last week. She was put on a tapering dose of oral birth control pills that stopped her bleeding, but bleeding then recurred after stopping the birth control pills and brought her back to the ER today. She said she was having some fairly heavy vaginal bleeding yesterday and earlier today, but it's now quite slow.    Loss, she is hemodynamically stable and not symptomatically orthostatic here in the ER. Hemoglobin is down another gram from 9.5 to 8.4, but at this point I don't think there is an indication for immediate transfusion.     She's not pregnant. She had a pelvic ultrasound last week that showed no structural abnormality that would cause her bleeding. No concern for STD causing bleeding. We suspect is likely  dysfunctional uterine bleeding, related to hormones.    I discussed the clinical situation with the patient's gynecologist, who knows her well. Again  recommended putting her back on a tapering dose of birth control pills starting higher at 5 pills today, with 4 tomorrow, 3 pills the day after, to pills that after that, and one pill every day she can recheck in clinic. The patient already has an appointment to recheck on Monday in the OB/GYN office of [facility]. I discussed the plan of care with the patient and her  are both in agreement.    An incidental headache. No red flags here such as fever, neck stiffness, sudden onset, severe symptoms. I don't think this represents subarachnoid hemorrhage, brain tumor, increased intracranial hemorrhage, meningitis. We treated her headache with migraine cocktails with good improvement.    Diagnosis:    ICD-10-CM    1. Excessive or frequent menstruation N92.0    2. Anemia, unspecified type D64.9       Disposition:   Discharged to home with the below prescription     Discharge Medications:  Discharge Medication List as of 11/10/2017  5:58 PM      START taking these medications    Details   ondansetron (ZOFRAN ODT) 4 MG ODT tab Take 1 tablet (4 mg) by mouth every 8 hours as needed for nausea, Disp-10 tablet, R-0, Local Print           Scribe Disclosure:  I, Luis Aldridge, am serving as a scribe at 4:37 PM on 11/10/2017 to document services personally performed by Asim Villar, *, based on my observations and the provider's statements to me.   [facility] EMERGENCY DEPARTMENT       Asim Villar MD  11/11/17 1388

## 2017-11-10 NOTE — ED NOTES
Patient was seen last week Thursday here in ED for vaginal bleeding after her birth control patch fell off. Patient was prescribed birth control pills until the vaginal bleeding stopped. Bleeding stopped Monday, but then started spotting on Tuesday and bleeding again. Patient also has a headache, dizzy, weakness, and general weakness. ABCDs intact. Alert and oriented x 4.

## 2017-11-10 NOTE — DISCHARGE INSTRUCTIONS
Please restart taking your birth control pills.  Take 5 of your birth control pills today- Friday  take 4 of your birth control pills tomorrow -Saturday  3 of your birth control pills on Sunday  take 2 of your birth control pills on Monday  take one of your birth control pills on Tuesday and every day until your gynecologist tells you to stop  sometimes taking birth control pills can cause nausea and upset stomach, so use Zofran if needed for that.

## 2017-11-10 NOTE — ED AVS SNAPSHOT
[facility] Emergency Department    [address] [place] [address]    Phone:  [phone]    Fax:  [phone]                                       Penny Juan   MRN: [id number]    Department:  [facility] Emergency Department   Date of Visit:  11/10/2017           Patient Information     Date Of Birth          1985        Your diagnoses for this visit were:     Excessive or frequent menstruation     Anemia, unspecified type        You were seen by Asim Villar MD.      Follow-up Information     Follow up with [facility], [place] In 3 days.    Specialty:  Family Practice    Contact information:    [address] [place] [address]  [phone]          Discharge Instructions       Please restart taking your birth control pills.  Take 5 of your birth control pills today- Friday  take 4 of your birth control pills tomorrow -Saturday  3 of your birth control pills on Sunday  take 2 of your birth control pills on Monday  take one of your birth control pills on Tuesday and every day until your gynecologist tells you to stop  sometimes taking birth control pills can cause nausea and upset stomach, so use Zofran if needed for that.      Discharge References/Attachments     DYSFUNCTIONAL UTERINE BLEEDING (ENGLISH)      24 Hour Appointment Hotline       To make an appointment at any [facility] clinic, call [phone] ([phone]). If you don't have a family doctor or clinic, we will help you find one. [facility] clinics are conveniently located to serve the needs of you and your family.             Review of your medicines      START taking        Dose / Directions Last dose taken    ondansetron 4 MG ODT tab   Commonly known as:  ZOFRAN ODT   Dose:  4 mg   Quantity:  10 tablet        Take 1 tablet (4 mg) by mouth every 8 hours as needed for nausea   Refills:  0          Our records show that you are taking the medicines listed below.  If these are incorrect, please call your family doctor or clinic.        Dose / Directions Last dose taken    docusate sodium 100 MG tablet   Commonly known as:  COLACE   Dose:  100 mg   Quantity:  10 tablet        Take 100 mg by mouth daily.   Refills:  0        ibuprofen 200 MG tablet   Commonly known as:  ADVIL/MOTRIN   Dose:  600 mg   Quantity:  60 tablet        Take 3 tablets (600 mg) by mouth every 6 hours as needed for mild pain or fever   Refills:  0        MULTIPLE VITAMIN PO        Take  by mouth.   Refills:  0                Prescriptions were sent or printed at these locations (1 Prescription)                   Other Prescriptions                Printed at Department/Unit printer (1 of 1)         ondansetron (ZOFRAN ODT) 4 MG ODT tab                Procedures and tests performed during your visit     Basic metabolic panel    CBC with platelets differential    HCG qualitative    INR    Partial thromboplastin time    Peripheral IV: Standard      Orders Needing Specimen Collection     None      Pending Results     No orders found from 11/8/2017 to 11/11/2017.            Pending Culture Results     No orders found from 11/8/2017 to 11/11/2017.            Pending Results Instructions     If you had any lab results that were not finalized at the time of your Discharge, you can call the ED Lab Result RN at [phone]. You will be contacted by this team for any positive Lab results or changes in treatment. The nurses are available 7 days a week from 10A to 6:30P.  You can leave a message 24 hours per day and they will return your call.        Test Results From Your Hospital Stay        11/10/2017  4:56 PM      Component Results     Component Value Ref Range & Units Status    WBC 6.8 4.0 - 11.0 10e9/L Final    RBC Count 3.43 (L) 3.8 - 5.2 10e12/L Final    Hemoglobin 8.4 (L) 11.7 - 15.7 g/dL Final    Hematocrit 27.2 (L) 35.0 - 47.0 % Final    MCV 79 78 - 100 fl Final    MCH 24.5 (L) 26.5 - 33.0 pg Final    MCHC  30.9 (L) 31.5 - 36.5 g/dL Final    RDW 14.4 10.0 - 15.0 % Final    Platelet Count 409 150 - 450 10e9/L Final    Diff Method Automated Method  Final    % Neutrophils 60.6 % Final    % Lymphocytes 30.5 % Final    % Monocytes 7.3 % Final    % Eosinophils 0.6 % Final    % Basophils 0.7 % Final    % Immature Granulocytes 0.3 % Final    Nucleated RBCs 0 0 /100 Final    Absolute Neutrophil 4.1 1.6 - 8.3 10e9/L Final    Absolute Lymphocytes 2.1 0.8 - 5.3 10e9/L Final    Absolute Monocytes 0.5 0.0 - 1.3 10e9/L Final    Absolute Eosinophils 0.0 0.0 - 0.7 10e9/L Final    Absolute Basophils 0.1 0.0 - 0.2 10e9/L Final    Abs Immature Granulocytes 0.0 0 - 0.4 10e9/L Final    Absolute Nucleated RBC 0.0  Final         11/10/2017  5:15 PM      Component Results     Component Value Ref Range & Units Status    Sodium 138 133 - 144 mmol/L Final    Potassium 3.0 (L) 3.4 - 5.3 mmol/L Final    Chloride 107 94 - 109 mmol/L Final    Carbon Dioxide 25 20 - 32 mmol/L Final    Anion Gap 6 3 - 14 mmol/L Final    Glucose 90 70 - 99 mg/dL Final    Urea Nitrogen 8 7 - 30 mg/dL Final    Creatinine 0.58 0.52 - 1.04 mg/dL Final    GFR Estimate >90 >60 mL/min/1.7m2 Final    Non  GFR Calc    GFR Estimate If Black >90 >60 mL/min/1.7m2 Final    African American GFR Calc    Calcium 7.9 (L) 8.5 - 10.1 mg/dL Final         11/10/2017  5:05 PM      Component Results     Component Value Ref Range & Units Status    INR 1.04 0.86 - 1.14 Final         11/10/2017  5:05 PM      Component Results     Component Value Ref Range & Units Status    PTT 31 22 - 37 sec Final         11/10/2017  5:16 PM      Component Results     Component Value Ref Range & Units Status    HCG Qualitative Serum Negative NEG^Negative Final    This test is for screening purposes.  Results should be interpreted along with   the clinical picture.  Confirmation testing is available if warranted by   ordering [id number], HCG Quantitative Pregnancy.                  Clinical Quality  Measure: Blood Pressure Screening     Your blood pressure was checked while you were in the emergency department today. The last reading we obtained was  BP: 122/88 . Please read the guidelines below about what these numbers mean and what you should do about them.  If your systolic blood pressure (the top number) is less than 120 and your diastolic blood pressure (the bottom number) is less than 80, then your blood pressure is normal. There is nothing more that you need to do about it.  If your systolic blood pressure (the top number) is 120-139 or your diastolic blood pressure (the bottom number) is 80-89, your blood pressure may be higher than it should be. You should have your blood pressure rechecked within a year by a primary care provider.  If your systolic blood pressure (the top number) is 140 or greater or your diastolic blood pressure (the bottom number) is 90 or greater, you may have high blood pressure. High blood pressure is treatable, but if left untreated over time it can put you at risk for heart attack, stroke, or kidney failure. You should have your blood pressure rechecked by a primary care provider within the next 4 weeks.  If your provider in the emergency department today gave you specific instructions to follow-up with your doctor or provider even sooner than that, you should follow that instruction and not wait for up to 4 weeks for your follow-up visit.        Thank you for choosing [facility]       Thank you for choosing [facility] for your care. Our goal is always to provide you with excellent care. Hearing back from our patients is one way we can continue to improve our services. Please take a few minutes to complete the written survey that you may receive in the mail after you visit with us. Thank you!        [employer] [facility] Information     [facility] lets you send messages to your doctor, view your test results, renew your prescriptions, schedule appointments and more. To sign up, go to  [email or web address] . Click on \"Log in\" on the left side of the screen, which will take you to the Welcome page. Then click on \"Sign up Now\" on the right side of the page.     You will be asked to enter the access code listed below, as well as some personal information. Please follow the directions to create your username and password.     Your access code is: [email or web address]  Expires: 2018  1:40 AM     Your access code will  in 90 days. If you need help or a new code, please call your [facility] clinic or [phone].        Care EveryWhere ID     This is your Care EveryWhere ID. This could be used by other organizations to access your [facility] medical records  [id number]        Equal Access to Services     KURTIS JEFFERS : Kiki López, margaret david, pravin francis, yeimi gregory . So [facility] [phone].    ATENCIÓN: Si habla español, tiene a baptiste disposición servicios gratuitos de asistencia lingüística. Llame al [phone].    We comply with applicable federal civil rights laws and [place] laws. We do not discriminate on the basis of race, color, national origin, age, disability, sex, sexual orientation, or gender identity.            After Visit Summary       This is your record. Keep this with you and show to your community pharmacist(s) and doctor(s) at your next visit.                  "

## 2017-11-10 NOTE — ED AVS SNAPSHOT
[facility] Emergency Department    [address]    [facility] [address]    Phone:  [phone]    Fax:  [phone]                                       Penny Juan   MRN: [id number]    Department:  [facility] Emergency Department   Date of Visit:  11/10/2017           After Visit Summary Signature Page     I have received my discharge instructions, and my questions have been answered. I have discussed any challenges I see with this plan with the nurse or doctor.    ..........................................................................................................................................  Patient/Patient Representative Signature      ..........................................................................................................................................  Patient Representative Print Name and Relationship to Patient    ..................................................               ................................................  Date                                            Time    ..........................................................................................................................................  Reviewed by Signature/Title    ...................................................              ..............................................  Date                                                            Time

## 2019-01-11 ENCOUNTER — HOSPITAL ENCOUNTER (EMERGENCY)
Facility: CLINIC | Age: 34
Discharge: HOME | End: 2019-01-11
Attending: EMERGENCY MEDICINE | Admitting: EMERGENCY MEDICINE
Payer: COMMERCIAL

## 2019-01-11 VITALS
SYSTOLIC BLOOD PRESSURE: 111 MMHG | HEART RATE: 68 BPM | TEMPERATURE: 98 F | OXYGEN SATURATION: 100 % | DIASTOLIC BLOOD PRESSURE: 75 MMHG | RESPIRATION RATE: 18 BRPM

## 2019-01-11 DIAGNOSIS — N93.8 DUB (DYSFUNCTIONAL UTERINE BLEEDING): ICD-10-CM

## 2019-01-11 LAB
ALBUMIN UR-MCNC: 30 MG/DL
ANION GAP SERPL CALCULATED.3IONS-SCNC: 8 MMOL/L (ref 3–14)
APPEARANCE UR: ABNORMAL
B-HCG SERPL-ACNC: <1 IU/L (ref 0–5)
BACTERIA #/AREA URNS HPF: ABNORMAL /HPF
BASOPHILS # BLD AUTO: 0.1 10E9/L (ref 0–0.2)
BASOPHILS NFR BLD AUTO: 1.1 %
BILIRUB UR QL STRIP: NEGATIVE
BUN SERPL-MCNC: 10 MG/DL (ref 7–30)
CALCIUM SERPL-MCNC: 8 MG/DL (ref 8.5–10.1)
CASTS #/AREA URNS LPF: <1 /LPF (ref 0–2)
CHLORIDE SERPL-SCNC: 109 MMOL/L (ref 94–109)
CO2 SERPL-SCNC: 24 MMOL/L (ref 20–32)
COLOR UR AUTO: YELLOW
CREAT SERPL-MCNC: 0.66 MG/DL (ref 0.52–1.04)
DIFFERENTIAL METHOD BLD: ABNORMAL
EOSINOPHIL # BLD AUTO: 0.1 10E9/L (ref 0–0.7)
EOSINOPHIL NFR BLD AUTO: 1.5 %
ERYTHROCYTE [DISTWIDTH] IN BLOOD BY AUTOMATED COUNT: 13.4 % (ref 10–15)
GFR SERPL CREATININE-BSD FRML MDRD: >90 ML/MIN/{1.73_M2}
GLUCOSE SERPL-MCNC: 102 MG/DL (ref 70–99)
GLUCOSE UR STRIP-MCNC: NEGATIVE MG/DL
HCG UR QL: NEGATIVE
HCT VFR BLD AUTO: 34.3 % (ref 35–47)
HGB BLD-MCNC: 10.9 G/DL (ref 11.7–15.7)
HGB UR QL STRIP: ABNORMAL
IMM GRANULOCYTES # BLD: 0 10E9/L (ref 0–0.4)
IMM GRANULOCYTES NFR BLD: 0.2 %
INTERPRETATION ECG - MUSE: NORMAL
KETONES UR STRIP-MCNC: NEGATIVE MG/DL
LEUKOCYTE ESTERASE UR QL STRIP: NEGATIVE
LYMPHOCYTES # BLD AUTO: 2.2 10E9/L (ref 0.8–5.3)
LYMPHOCYTES NFR BLD AUTO: 40.1 %
MCH RBC QN AUTO: 29.5 PG (ref 26.5–33)
MCHC RBC AUTO-ENTMCNC: 31.8 G/DL (ref 31.5–36.5)
MCV RBC AUTO: 93 FL (ref 78–100)
MONOCYTES # BLD AUTO: 0.5 10E9/L (ref 0–1.3)
MONOCYTES NFR BLD AUTO: 9.7 %
MUCOUS THREADS #/AREA URNS LPF: PRESENT /LPF
NEUTROPHILS # BLD AUTO: 2.6 10E9/L (ref 1.6–8.3)
NEUTROPHILS NFR BLD AUTO: 47.4 %
NITRATE UR QL: NEGATIVE
NRBC # BLD AUTO: 0 10*3/UL
NRBC BLD AUTO-RTO: 0 /100
PH UR STRIP: 6 PH (ref 5–7)
PLATELET # BLD AUTO: 357 10E9/L (ref 150–450)
POTASSIUM SERPL-SCNC: 3.5 MMOL/L (ref 3.4–5.3)
RBC # BLD AUTO: 3.7 10E12/L (ref 3.8–5.2)
RBC #/AREA URNS AUTO: >182 /HPF (ref 0–2)
SODIUM SERPL-SCNC: 141 MMOL/L (ref 133–144)
SOURCE: ABNORMAL
SP GR UR STRIP: 1.02 (ref 1–1.03)
UROBILINOGEN UR STRIP-MCNC: 0 MG/DL (ref 0–2)
WBC # BLD AUTO: 5.4 10E9/L (ref 4–11)
WBC #/AREA URNS AUTO: 5 /HPF (ref 0–5)

## 2019-01-11 PROCEDURE — 25000128 H RX IP 250 OP 636: Performed by: EMERGENCY MEDICINE

## 2019-01-11 PROCEDURE — 81025 URINE PREGNANCY TEST: CPT | Performed by: EMERGENCY MEDICINE

## 2019-01-11 PROCEDURE — 96360 HYDRATION IV INFUSION INIT: CPT

## 2019-01-11 PROCEDURE — 84702 CHORIONIC GONADOTROPIN TEST: CPT | Performed by: EMERGENCY MEDICINE

## 2019-01-11 PROCEDURE — 80048 BASIC METABOLIC PNL TOTAL CA: CPT | Performed by: EMERGENCY MEDICINE

## 2019-01-11 PROCEDURE — 85025 COMPLETE CBC W/AUTO DIFF WBC: CPT | Performed by: EMERGENCY MEDICINE

## 2019-01-11 PROCEDURE — 93005 ELECTROCARDIOGRAM TRACING: CPT

## 2019-01-11 PROCEDURE — 99284 EMERGENCY DEPT VISIT MOD MDM: CPT | Mod: 25

## 2019-01-11 PROCEDURE — 81001 URINALYSIS AUTO W/SCOPE: CPT | Performed by: EMERGENCY MEDICINE

## 2019-01-11 RX ADMIN — SODIUM CHLORIDE 1000 ML: 9 INJECTION, SOLUTION INTRAVENOUS at 06:49

## 2019-01-11 ASSESSMENT — ENCOUNTER SYMPTOMS
SHORTNESS OF BREATH: 1
FATIGUE: 1
ABDOMINAL PAIN: 1

## 2019-01-11 NOTE — ED TRIAGE NOTES
Vaginal bleeding started at 8am yesterday, worst starting at 12pm going through 4 pads/hr. This morning, woke up at 5am with bed soaked with patients blood. Per patient, already completed menstrual cycle last. Per spouse, syncope episode x2 around 5am striking her head onto hardwood floor. Per does not remember falling. ABCs intact.

## 2019-01-11 NOTE — ED PROVIDER NOTES
History     Chief Complaint:  Vaginal Bleeding    HPI   Penny Juan is a 33 year old female who presents to the emergency department today for evaluation of vaginal bleeding. The patient started having heavy vaginal bleeding yesterday. She states she has been going through 2-3 pads per hour and has low abdominal pain. She also notes some shortness of breath and fatigue. She states she has a history of vaginal bleeding, but not to this extent.     Allergies:  No Known Drug Allergies    Medications:    Iron    Past Medical History:    Anemia    Past Surgical History:    Gyn Surgery    Family History:    History reviewed. No pertinent family history.    Social History:  The patient was accompanied to the ED by her family.  Smoking Status: Never Smoker  Smokeless Tobacco: Never Used  Alcohol Use: Negative      Marital Status:        Review of Systems   Constitutional: Positive for fatigue.   Respiratory: Positive for shortness of breath.    Gastrointestinal: Positive for abdominal pain.   Genitourinary: Positive for vaginal bleeding.   All other systems reviewed and are negative.    Physical Exam     Patient Vitals for the past 24 hrs:   BP Temp Temp src Pulse Heart Rate Resp SpO2   01/11/19 0655 111/75 -- -- 68 -- -- 100 %   01/11/19 0645 99/83 -- -- 73 -- -- 100 %   01/11/19 0600 103/63 -- -- 78 -- -- 100 %   01/11/19 0533 124/78 98  F (36.7  C) Temporal 78 78 18 99 %      Physical Exam  General: Patient is alert and interactive when I enter the room  Head:  The scalp, face, and head appear normal  Eyes:  The pupils are equal, round, and reactive to light    Conjunctivae and sclerae are normal  ENT:    External acoustic canals are normal    The oropharynx is normal without erythema.     Uvula is in the midline  Neck:  Normal range of motion  CV:  Regular rate. S1/S2. No murmurs. Not tachycardic  Resp:  Lungs are clear without wheezes or rales. No distress  GI:  Abdomen is soft, no rigidity,  guarding, or rebound    No distension. No tenderness to palpation in any quadrant.     MS:  Normal tone. Joints grossly normal without effusions.     No asymmetric leg swelling, calf or thigh tenderness.      Normal motor assessment of all extremities.  Skin:  No rash or lesions noted. Normal capillary refill noted  Neuro: Speech is normal and fluent. Face is symmetric.     Moving all extremities well.   Psych:  Awake. Alert.  Normal affect.  Appropriate interactions.  Lymph: No anterior cervical lymphadenopathy noted    Pelvic Exam:  Chaperone: Beatrice, a nurse  Scant amount of bleeding, not hemorrhaging.     Emergency Department Course     ECG:  ECG taken at 0554, ECG read at 0630  Normal sinus rhythm  Normal ECG  Rate 64 bpm. OR interval 154 ms. QRS duration 76 ms. QT/QTc 428/441 ms. P-R-T axes 65 43 43.    Laboratory:  Laboratory findings were communicated with the patient who voiced understanding of the findings.    HCG: <1  CBC: WBC 5.4, HGB 10.9,   BMP: Glucose 102, o/w WNL (Creatinine 0.66)  UA: Blood large, Albumin 30, RBC >182, Mucous present, Bacteria few    Interventions:  0649 NS, 1 L, IV     Emergency Department Course:    0555 IV was inserted and blood was drawn for laboratory testing, results above.     0625 Nursing notes and vitals reviewed.    0630 I performed an exam of the patient as documented above.     0715 Pelvic exam performed.     0716 The patient provided a urine sample here in the emergency department. This was sent for laboratory testing, findings above.     0730 I personally reviewed the lab results with the patient and answered all related questions prior to discharge.    Impression & Plan      Medical Decision Making:  Penny Juan is a 33 year old female who presents for evaluation of uterine bleeding.  This is clinically consistent with dysfunctional uterine bleeding. Patient is not pregnant. There are no signs at this point of acute medical issues causing DUB to  warrant further workup in this patient (pituitary tumor, thyroid disease, etc).  Her hemoglobin is reassurring.  Will have her follow-up with gynecology.     Diagnosis:    ICD-10-CM    1. DUB (dysfunctional uterine bleeding) N93.8      Disposition:   Discharge    Discharge Medications:START taking      Dose / Directions   norgestrel-ethinyl estradiol 0.3-30 MG-MCG tablet  Commonly known as:  LO/OVRAL      Start taking on:  1/11/2019  Take 2 tablets by mouth daily for 2 days, THEN 1 tablet daily for 24 days.  Quantity:  28 tablet  Refills:  0       Scribe Disclosure:  Will LIU, am serving as a scribe at 6:34 AM on 1/11/2019 to document services personally performed by Adin Tello MD based on my observations and the provider's statements to me.    [facility] EMERGENCY DEPARTMENT       Adin Tello MD  01/12/19 5031

## 2019-01-11 NOTE — ED AVS SNAPSHOT
[facility] Emergency Department  [address]  [facility] [address]  Phone:  [phone]  Fax:  [phone]                                    Penny Juan   MRN: [id number]    Department:  [facility] Emergency Department   Date of Visit:  1/11/2019           After Visit Summary Signature Page    I have received my discharge instructions, and my questions have been answered. I have discussed any challenges I see with this plan with the nurse or doctor.    ..........................................................................................................................................  Patient/Patient Representative Signature      ..........................................................................................................................................  Patient Representative Print Name and Relationship to Patient    ..................................................               ................................................  Date                                   Time    ..........................................................................................................................................  Reviewed by Signature/Title    ...................................................              ..............................................  Date                                               Time          22EPIC Rev 08/18

## 2020-04-24 ENCOUNTER — APPOINTMENT (OUTPATIENT)
Dept: CT IMAGING | Facility: CLINIC | Age: 35
End: 2020-04-24
Attending: EMERGENCY MEDICINE
Payer: COMMERCIAL

## 2020-04-24 ENCOUNTER — HOSPITAL ENCOUNTER (EMERGENCY)
Facility: CLINIC | Age: 35
Discharge: HOME | End: 2020-04-24
Attending: EMERGENCY MEDICINE | Admitting: EMERGENCY MEDICINE
Payer: COMMERCIAL

## 2020-04-24 VITALS
HEART RATE: 65 BPM | OXYGEN SATURATION: 98 % | TEMPERATURE: 97.4 F | DIASTOLIC BLOOD PRESSURE: 79 MMHG | RESPIRATION RATE: 16 BRPM | SYSTOLIC BLOOD PRESSURE: 112 MMHG

## 2020-04-24 DIAGNOSIS — G44.209 TENSION-TYPE HEADACHE, NOT INTRACTABLE, UNSPECIFIED CHRONICITY PATTERN: ICD-10-CM

## 2020-04-24 DIAGNOSIS — M54.2 NECK PAIN: ICD-10-CM

## 2020-04-24 LAB
ANION GAP SERPL CALCULATED.3IONS-SCNC: 5 MMOL/L (ref 3–14)
B-HCG FREE SERPL-ACNC: <5 IU/L
BASOPHILS # BLD AUTO: 0 10E9/L (ref 0–0.2)
BASOPHILS NFR BLD AUTO: 0.5 %
BUN SERPL-MCNC: 7 MG/DL (ref 7–30)
CALCIUM SERPL-MCNC: 9 MG/DL (ref 8.5–10.1)
CHLORIDE SERPL-SCNC: 105 MMOL/L (ref 94–109)
CO2 SERPL-SCNC: 29 MMOL/L (ref 20–32)
CREAT SERPL-MCNC: 0.44 MG/DL (ref 0.52–1.04)
DIFFERENTIAL METHOD BLD: NORMAL
EOSINOPHIL # BLD AUTO: 0.1 10E9/L (ref 0–0.7)
EOSINOPHIL NFR BLD AUTO: 1.7 %
ERYTHROCYTE [DISTWIDTH] IN BLOOD BY AUTOMATED COUNT: 12.6 % (ref 10–15)
GFR SERPL CREATININE-BSD FRML MDRD: >90 ML/MIN/{1.73_M2}
GLUCOSE SERPL-MCNC: 100 MG/DL (ref 70–99)
HCT VFR BLD AUTO: 41.4 % (ref 35–47)
HGB BLD-MCNC: 13.5 G/DL (ref 11.7–15.7)
IMM GRANULOCYTES # BLD: 0 10E9/L (ref 0–0.4)
IMM GRANULOCYTES NFR BLD: 0.2 %
LYMPHOCYTES # BLD AUTO: 2.3 10E9/L (ref 0.8–5.3)
LYMPHOCYTES NFR BLD AUTO: 28.7 %
MCH RBC QN AUTO: 29.2 PG (ref 26.5–33)
MCHC RBC AUTO-ENTMCNC: 32.6 G/DL (ref 31.5–36.5)
MCV RBC AUTO: 89 FL (ref 78–100)
MONOCYTES # BLD AUTO: 0.7 10E9/L (ref 0–1.3)
MONOCYTES NFR BLD AUTO: 8.4 %
NEUTROPHILS # BLD AUTO: 4.9 10E9/L (ref 1.6–8.3)
NEUTROPHILS NFR BLD AUTO: 60.5 %
NRBC # BLD AUTO: 0 10*3/UL
NRBC BLD AUTO-RTO: 0 /100
PLATELET # BLD AUTO: 308 10E9/L (ref 150–450)
POTASSIUM SERPL-SCNC: 4 MMOL/L (ref 3.4–5.3)
RBC # BLD AUTO: 4.63 10E12/L (ref 3.8–5.2)
SODIUM SERPL-SCNC: 139 MMOL/L (ref 133–144)
WBC # BLD AUTO: 8 10E9/L (ref 4–11)

## 2020-04-24 PROCEDURE — 84702 CHORIONIC GONADOTROPIN TEST: CPT

## 2020-04-24 PROCEDURE — 70498 CT ANGIOGRAPHY NECK: CPT

## 2020-04-24 PROCEDURE — 96374 THER/PROPH/DIAG INJ IV PUSH: CPT | Mod: 59

## 2020-04-24 PROCEDURE — 25000132 ZZH RX MED GY IP 250 OP 250 PS 637: Performed by: EMERGENCY MEDICINE

## 2020-04-24 PROCEDURE — 25000128 H RX IP 250 OP 636: Performed by: EMERGENCY MEDICINE

## 2020-04-24 PROCEDURE — 99285 EMERGENCY DEPT VISIT HI MDM: CPT | Mod: 25

## 2020-04-24 PROCEDURE — 85025 COMPLETE CBC W/AUTO DIFF WBC: CPT | Performed by: EMERGENCY MEDICINE

## 2020-04-24 PROCEDURE — 25800030 ZZH RX IP 258 OP 636: Performed by: EMERGENCY MEDICINE

## 2020-04-24 PROCEDURE — 80048 BASIC METABOLIC PNL TOTAL CA: CPT | Performed by: EMERGENCY MEDICINE

## 2020-04-24 PROCEDURE — 70450 CT HEAD/BRAIN W/O DYE: CPT

## 2020-04-24 RX ORDER — IOPAMIDOL 755 MG/ML
500 INJECTION, SOLUTION INTRAVASCULAR ONCE
Status: COMPLETED | OUTPATIENT
Start: 2020-04-24 | End: 2020-04-24

## 2020-04-24 RX ORDER — METHOCARBAMOL 750 MG/1
750 TABLET, FILM COATED ORAL 4 TIMES DAILY PRN
Qty: 15 TABLET | Refills: 0 | Status: SHIPPED | OUTPATIENT
Start: 2020-04-24

## 2020-04-24 RX ORDER — KETOROLAC TROMETHAMINE 15 MG/ML
15 INJECTION, SOLUTION INTRAMUSCULAR; INTRAVENOUS ONCE
Status: COMPLETED | OUTPATIENT
Start: 2020-04-24 | End: 2020-04-24

## 2020-04-24 RX ORDER — IBUPROFEN 600 MG/1
600 TABLET, FILM COATED ORAL EVERY 6 HOURS PRN
Qty: 20 TABLET | Refills: 0 | Status: SHIPPED | OUTPATIENT
Start: 2020-04-24

## 2020-04-24 RX ORDER — METHOCARBAMOL 750 MG/1
750 TABLET, FILM COATED ORAL ONCE
Status: COMPLETED | OUTPATIENT
Start: 2020-04-24 | End: 2020-04-24

## 2020-04-24 RX ADMIN — IOPAMIDOL 70 ML: 755 INJECTION, SOLUTION INTRAVENOUS at 03:41

## 2020-04-24 RX ADMIN — SODIUM CHLORIDE 80 ML: 9 INJECTION, SOLUTION INTRAVENOUS at 03:41

## 2020-04-24 RX ADMIN — METHOCARBAMOL TABLETS 750 MG: 750 TABLET, COATED ORAL at 03:08

## 2020-04-24 RX ADMIN — KETOROLAC TROMETHAMINE 15 MG: 15 INJECTION, SOLUTION INTRAMUSCULAR; INTRAVENOUS at 03:08

## 2020-04-24 ASSESSMENT — ENCOUNTER SYMPTOMS
FEVER: 0
PHOTOPHOBIA: 1
SHORTNESS OF BREATH: 0
VOMITING: 0
BACK PAIN: 0
NECK PAIN: 1
HEADACHES: 1
DIZZINESS: 1
COUGH: 0
NAUSEA: 0

## 2020-04-24 NOTE — ED PROVIDER NOTES
History     Chief Complaint:  Headache    HPI   Penny Juan is a 35 year old female who presents for evaluation of a headache that was sudden in onset. This pain started about one hour ago while she was standing in the kitchen. She localizes the pain to the right side of her head and states it started in her neck/occipital but has radiated towards the front. She took a dose of Tylenol at home, however reports that did not provide relief. As she has no history of similar headaches, nor has she experienced a migraine before, she decided to present to the ED. The headache/neck pain is exacerbated when she moves her head in any direction, however the most pain comes from looking up towards the right. The pain is associated with photophobia and intermittently blurry vision. She also notes room-spinning dizziness when she moves from laying down to standing up. She denies any loss of vision, nausea, vomiting, back pain, recent fevers, or recent cough/cold symptoms. She does not go to a chiropractor regularly, nor does she report any recent neck injury.     Allergies:  No Known Drug Allergies     Medications:    Ferrous sulfate    Past Medical History:    Anemia  Abnormal uterine bleeding   Scoliosis   ACL tear  Bell's Palsy    Past Surgical History:     x2  Tubal ligation     Family History:    No past pertinent family history.    Social History:  Marital Status:   [2]  Negative for tobacco use.  Positive for occasional alcohol use.   Negative for drug use.      Review of Systems   Constitutional: Negative for fever.   HENT: Negative for congestion.    Eyes: Positive for photophobia and visual disturbance.   Respiratory: Negative for cough and shortness of breath.    Gastrointestinal: Negative for nausea and vomiting.   Musculoskeletal: Positive for neck pain. Negative for back pain.   Neurological: Positive for dizziness and headaches.   All other systems reviewed and are  negative.      Physical Exam     Patient Vitals for the past 24 hrs:   BP Temp Temp src Pulse Heart Rate Resp SpO2   04/24/20 0300 112/79 -- -- 65 -- -- 99 %   04/24/20 0247 116/82 -- -- 61 -- -- --   04/24/20 0245 116/82 -- -- -- -- -- --   04/24/20 0221 -- 97.4  F (36.3  C) Oral -- 73 16 99 %      Physical Exam  Vital signs and nursing notes reviewed.     Constitutional: laying on gurney appears comfortable  HENT: Oropharynx is clear and moist.  No evidence of mastoid swelling or tenderness  Eyes: Conjunctivae are normal bilaterally. Pupils equal round and reactive.  No evidence of nystagmus.  Extraocular movements are fully intact and normal  Neck: Increased pain with movement specifically with extension and turning to the right.  No neck swelling noted lymphadenopathy.  Tenderness to palpation at the right occiput without evidence of abscess or overlying soft tissue swelling.  Cardiovascular: Normal rate, regular rhythm, normal heart sounds.   Pulmonary/Chest: Effort normal and breath sounds normal. No respiratory distress.   Abdominal: Soft. Bowel sounds are normal. No tenderness to palpation. No rebound or guarding.   Musculoskeletal: No joint swelling or edema.   Neurological: Alert and oriented. No focal weakness in upper or lower extremities.  No confusion.  Normal speech.  No paresthesias  Skin: Skin is warm and dry. No rash noted.   Psych: normal affect    Emergency Department Course   Imaging:  Radiographic findings were communicated with the patient who voiced understanding of the findings.  CT Head without contrast:    Normal head CT as per radiology.    CTA Head neck w/ IV contrast:   HEAD CTA:   1.  Normal CTA [place].     NECK CTA:   1.  Normal neck CTA, as per radiology.    Laboratory:  CBC: WBC: 8.0, HGB: 13.5, PLT: 308  BMP: Glucose 100 (H), Creatinine: 0.44 (L), o/w WNL     ISTAT hCG: <5.0    Interventions:  0308 Robaxin, 750 mg, PO   Toradol, 15 mg, IV injection    Emergency Department  Course:  Nursing notes and vitals reviewed.   0231: I performed an exam of the patient as documented above.      IV was inserted and blood was drawn for laboratory testing, results above. Medicine administered as documented above.   The patient was sent for a head and neck imaging while in the emergency department, results above.      0403: I rechecked the patient and discussed the results of her workup thus far. She reports improvement in her symptoms.     Findings and plan explained to the Patient. Patient discharged home with instructions regarding supportive care, medications, and reasons to return. The importance of close follow-up was reviewed. The patient was prescribed Robaxin and ibuprofen.     I personally reviewed the laboratory and imaging results with the Patient and answered all related questions prior to discharge.    Impression & Plan      Medical Decision Making:  Penny Juan is a 35 year old female who presents with a right sided headache. On examination, she had reproducible pain to her right occiput, as well as apparent neck tightness. She also mentioned that she may have had some mild blurriness in her vision as well as a dizziness feeling - specifically vertigo - earlier tonight. On exam, she has no nystagmus or obvious suggestion of posterior cerebellar dysfunction, however with the acute onset of her symptoms with unexplained nature and reported symptomatology, I felt it would be best to rule out vertebral artery process. This was, thankfully, negative on her CT and CTA imaging of her head and neck. Patient was given an antiinflammatory and muscle relaxant, and later reported marked improvement in her symptoms. I suspect there is likely a component of tension headache to her symptoms. But there could also be some mild occipital neuralgia.. At this time, with negative imaging and lack of fever, I do not feel that any further testing is warranted. I discussed with her icing, heat,  and stretching her neck as well as continuing antiinflammatories and muscle relaxants at home. If she has any progressive or worsening symptoms, she should return here otherwise she should follow-up with her primary care physician. Patient understands the plan and was discharged home.     Diagnosis:    ICD-10-CM    1. Neck pain  M54.2    2. Tension-type headache, not intractable, unspecified chronicity pattern  G44.209        Disposition:  discharged to home    Discharge Medications:  New Prescriptions    IBUPROFEN (ADVIL/MOTRIN) 600 MG TABLET    Take 1 tablet (600 mg) by mouth every 6 hours as needed for moderate pain    METHOCARBAMOL (ROBAXIN) 750 MG TABLET    Take 1 tablet (750 mg) by mouth 4 times daily as needed for muscle spasms     Scribe Disclosure:  I, Evelia Benitez, am serving as a scribe on 4/24/2020 at 2:31 AM to personally document services performed by Jeff Lyon MD based on my observations and the provider's statements to me.      4/24/2020   [facility] EMERGENCY DEPARTMENT       Jeff Lyon MD  04/24/20 0444

## 2020-04-24 NOTE — ED TRIAGE NOTES
Pt states headache tonight with nausea and photophobia. Denies hx of migraines. ABCs intact GCS 15

## 2020-04-24 NOTE — ED AVS SNAPSHOT
[facility] Emergency Department  [address]  [facility] [address]  Phone:  [phone]  Fax:  [phone]                                    Penny Juan   MRN: [id number]    Department:  [facility] Emergency Department   Date of Visit:  4/24/2020           After Visit Summary Signature Page    I have received my discharge instructions, and my questions have been answered. I have discussed any challenges I see with this plan with the nurse or doctor.    ..........................................................................................................................................  Patient/Patient Representative Signature      ..........................................................................................................................................  Patient Representative Print Name and Relationship to Patient    ..................................................               ................................................  Date                                   Time    ..........................................................................................................................................  Reviewed by Signature/Title    ...................................................              ..............................................  Date                                               Time          22EPIC Rev 08/18